# Patient Record
Sex: FEMALE | Race: WHITE | NOT HISPANIC OR LATINO | Employment: OTHER | URBAN - METROPOLITAN AREA
[De-identification: names, ages, dates, MRNs, and addresses within clinical notes are randomized per-mention and may not be internally consistent; named-entity substitution may affect disease eponyms.]

---

## 2022-10-25 LAB
BASOPHILS # BLD AUTO: 0 10E3/UL (ref 0–0.2)
BASOPHILS NFR BLD AUTO: 1 %
EOSINOPHIL # BLD AUTO: 0 10E3/UL (ref 0–0.7)
EOSINOPHIL NFR BLD AUTO: 0 %
ERYTHROCYTE [DISTWIDTH] IN BLOOD BY AUTOMATED COUNT: 11.7 % (ref 10–15)
HCT VFR BLD AUTO: 41.7 % (ref 35–47)
HGB BLD-MCNC: 13.9 G/DL (ref 11.7–15.7)
IMM GRANULOCYTES # BLD: 0 10E3/UL
IMM GRANULOCYTES NFR BLD: 1 %
LYMPHOCYTES # BLD AUTO: 0.4 10E3/UL (ref 0.8–5.3)
LYMPHOCYTES NFR BLD AUTO: 20 %
MCH RBC QN AUTO: 32 PG (ref 26.5–33)
MCHC RBC AUTO-ENTMCNC: 33.3 G/DL (ref 31.5–36.5)
MCV RBC AUTO: 96 FL (ref 78–100)
MONOCYTES # BLD AUTO: 0.4 10E3/UL (ref 0–1.3)
MONOCYTES NFR BLD AUTO: 20 %
NEUTROPHILS # BLD AUTO: 1.2 10E3/UL (ref 1.6–8.3)
NEUTROPHILS NFR BLD AUTO: 58 %
NRBC # BLD AUTO: 0 10E3/UL
NRBC BLD AUTO-RTO: 0 /100
PLATELET # BLD AUTO: 178 10E3/UL (ref 150–450)
RBC # BLD AUTO: 4.35 10E6/UL (ref 3.8–5.2)
WBC # BLD AUTO: 2 10E3/UL (ref 4–11)

## 2022-10-25 PROCEDURE — 250N000013 HC RX MED GY IP 250 OP 250 PS 637: Performed by: EMERGENCY MEDICINE

## 2022-10-25 PROCEDURE — 80048 BASIC METABOLIC PNL TOTAL CA: CPT | Performed by: EMERGENCY MEDICINE

## 2022-10-25 PROCEDURE — 99284 EMERGENCY DEPT VISIT MOD MDM: CPT | Mod: 25

## 2022-10-25 PROCEDURE — 84703 CHORIONIC GONADOTROPIN ASSAY: CPT | Performed by: EMERGENCY MEDICINE

## 2022-10-25 PROCEDURE — 85025 COMPLETE CBC W/AUTO DIFF WBC: CPT | Performed by: EMERGENCY MEDICINE

## 2022-10-25 PROCEDURE — 36415 COLL VENOUS BLD VENIPUNCTURE: CPT | Performed by: EMERGENCY MEDICINE

## 2022-10-25 RX ORDER — ACETAMINOPHEN 500 MG
1000 TABLET ORAL ONCE
Status: COMPLETED | OUTPATIENT
Start: 2022-10-25 | End: 2022-10-25

## 2022-10-25 RX ADMIN — ACETAMINOPHEN 1000 MG: 500 TABLET ORAL at 22:19

## 2022-10-26 ENCOUNTER — TELEPHONE (OUTPATIENT)
Dept: EMERGENCY MEDICINE | Facility: CLINIC | Age: 37
End: 2022-10-26

## 2022-10-26 ENCOUNTER — HOSPITAL ENCOUNTER (EMERGENCY)
Facility: CLINIC | Age: 37
Discharge: HOME | End: 2022-10-26
Attending: EMERGENCY MEDICINE | Admitting: EMERGENCY MEDICINE
Payer: COMMERCIAL

## 2022-10-26 VITALS
OXYGEN SATURATION: 99 % | TEMPERATURE: 97 F | SYSTOLIC BLOOD PRESSURE: 117 MMHG | DIASTOLIC BLOOD PRESSURE: 115 MMHG | RESPIRATION RATE: 18 BRPM | HEART RATE: 37 BPM

## 2022-10-26 DIAGNOSIS — H53.149 PHOTOPHOBIA: ICD-10-CM

## 2022-10-26 DIAGNOSIS — Z86.69 H/O MIGRAINE: ICD-10-CM

## 2022-10-26 DIAGNOSIS — R51.9 ACUTE NONINTRACTABLE HEADACHE, UNSPECIFIED HEADACHE TYPE: ICD-10-CM

## 2022-10-26 DIAGNOSIS — R00.1 SINUS BRADYCARDIA: ICD-10-CM

## 2022-10-26 LAB
ANION GAP SERPL CALCULATED.3IONS-SCNC: 14 MMOL/L (ref 7–15)
ATRIAL RATE - MUSE: 45 BPM
BUN SERPL-MCNC: 10.1 MG/DL (ref 6–20)
CALCIUM SERPL-MCNC: 8.8 MG/DL (ref 8.6–10)
CHLORIDE SERPL-SCNC: 92 MMOL/L (ref 98–107)
CREAT SERPL-MCNC: 0.67 MG/DL (ref 0.51–0.95)
DEPRECATED HCO3 PLAS-SCNC: 21 MMOL/L (ref 22–29)
DIASTOLIC BLOOD PRESSURE - MUSE: NORMAL MMHG
FLUAV RNA SPEC QL NAA+PROBE: NEGATIVE
FLUBV RNA RESP QL NAA+PROBE: NEGATIVE
GFR SERPL CREATININE-BSD FRML MDRD: >90 ML/MIN/1.73M2
GLUCOSE SERPL-MCNC: 146 MG/DL (ref 70–99)
HCG SERPL QL: NEGATIVE
HOLD SPECIMEN: NORMAL
INTERPRETATION ECG - MUSE: NORMAL
P AXIS - MUSE: 60 DEGREES
POTASSIUM SERPL-SCNC: 3.6 MMOL/L (ref 3.4–5.3)
PR INTERVAL - MUSE: 154 MS
QRS DURATION - MUSE: 88 MS
QT - MUSE: 520 MS
QTC - MUSE: 449 MS
R AXIS - MUSE: 86 DEGREES
RSV RNA SPEC NAA+PROBE: POSITIVE
SARS-COV-2 RNA RESP QL NAA+PROBE: POSITIVE
SODIUM SERPL-SCNC: 127 MMOL/L (ref 136–145)
SYSTOLIC BLOOD PRESSURE - MUSE: NORMAL MMHG
T AXIS - MUSE: 87 DEGREES
VENTRICULAR RATE- MUSE: 45 BPM

## 2022-10-26 PROCEDURE — 96374 THER/PROPH/DIAG INJ IV PUSH: CPT

## 2022-10-26 PROCEDURE — 250N000013 HC RX MED GY IP 250 OP 250 PS 637: Performed by: EMERGENCY MEDICINE

## 2022-10-26 PROCEDURE — 96361 HYDRATE IV INFUSION ADD-ON: CPT

## 2022-10-26 PROCEDURE — C9803 HOPD COVID-19 SPEC COLLECT: HCPCS

## 2022-10-26 PROCEDURE — 93005 ELECTROCARDIOGRAM TRACING: CPT

## 2022-10-26 PROCEDURE — 96375 TX/PRO/DX INJ NEW DRUG ADDON: CPT

## 2022-10-26 PROCEDURE — 87637 SARSCOV2&INF A&B&RSV AMP PRB: CPT | Performed by: EMERGENCY MEDICINE

## 2022-10-26 PROCEDURE — 250N000011 HC RX IP 250 OP 636: Performed by: EMERGENCY MEDICINE

## 2022-10-26 PROCEDURE — 258N000003 HC RX IP 258 OP 636: Performed by: EMERGENCY MEDICINE

## 2022-10-26 RX ORDER — DIPHENHYDRAMINE HYDROCHLORIDE 50 MG/ML
25 INJECTION INTRAMUSCULAR; INTRAVENOUS ONCE
Status: COMPLETED | OUTPATIENT
Start: 2022-10-26 | End: 2022-10-26

## 2022-10-26 RX ORDER — METOCLOPRAMIDE HYDROCHLORIDE 5 MG/ML
10 INJECTION INTRAMUSCULAR; INTRAVENOUS ONCE
Status: COMPLETED | OUTPATIENT
Start: 2022-10-26 | End: 2022-10-26

## 2022-10-26 RX ORDER — SODIUM CHLORIDE 9 MG/ML
INJECTION, SOLUTION INTRAVENOUS CONTINUOUS
Status: DISCONTINUED | OUTPATIENT
Start: 2022-10-26 | End: 2022-10-26 | Stop reason: HOSPADM

## 2022-10-26 RX ORDER — LORAZEPAM 0.5 MG/1
0.5 TABLET ORAL ONCE
Status: COMPLETED | OUTPATIENT
Start: 2022-10-26 | End: 2022-10-26

## 2022-10-26 RX ORDER — KETOROLAC TROMETHAMINE 15 MG/ML
15 INJECTION, SOLUTION INTRAMUSCULAR; INTRAVENOUS ONCE
Status: COMPLETED | OUTPATIENT
Start: 2022-10-26 | End: 2022-10-26

## 2022-10-26 RX ADMIN — METOCLOPRAMIDE HYDROCHLORIDE 10 MG: 5 INJECTION INTRAMUSCULAR; INTRAVENOUS at 01:12

## 2022-10-26 RX ADMIN — DIPHENHYDRAMINE HYDROCHLORIDE 25 MG: 50 INJECTION, SOLUTION INTRAMUSCULAR; INTRAVENOUS at 01:11

## 2022-10-26 RX ADMIN — KETOROLAC TROMETHAMINE 15 MG: 15 INJECTION, SOLUTION INTRAMUSCULAR; INTRAVENOUS at 01:12

## 2022-10-26 RX ADMIN — DIPHENHYDRAMINE HYDROCHLORIDE 25 MG: 50 INJECTION, SOLUTION INTRAMUSCULAR; INTRAVENOUS at 01:36

## 2022-10-26 RX ADMIN — LORAZEPAM 0.5 MG: 0.5 TABLET ORAL at 01:36

## 2022-10-26 RX ADMIN — SODIUM CHLORIDE 1000 ML: 9 INJECTION, SOLUTION INTRAVENOUS at 01:13

## 2022-10-26 ASSESSMENT — ENCOUNTER SYMPTOMS
NAUSEA: 1
VOMITING: 0
HEADACHES: 1
COUGH: 0
PHOTOPHOBIA: 1
SORE THROAT: 0

## 2022-10-26 ASSESSMENT — ACTIVITIES OF DAILY LIVING (ADL): ADLS_ACUITY_SCORE: 33

## 2022-10-26 NOTE — DISCHARGE INSTRUCTIONS
Discharge Instructions  Migraine    You were seen today for a headache that your provider thinks is likely a migraine. At this time your provider does not find that your headache is a sign of anything dangerous or life-threatening.  However, sometimes the signs of serious illness do not show up right away.      Generally, every Emergency Department visit should have a follow-up clinic visit with either a primary or a specialty clinic/provider. Please follow-up as instructed by your emergency provider today.    Return to the Emergency Department if:  You get a fever of 100.4 F or higher.  You get a stiff neck with your headache.  You get a new headache that is different or worse than headaches you have had before.  You are vomiting (throwing up) and cannot keep food or water down.  You have blurry or double vision or other problems with your eyes.  You have a new weakness on one side of your body.  You have difficulty with balance which is new.  You or your family thinks you are confused.  You have a seizure.    Treatment:  Often, treatment for your migraine will take some time to make you headache stop.  Going home to sleep can be very effective.  Use your medications as directed; overuse of medications can actually cause headaches.  Once your headache has gone away, avoid triggers such as certain foods, skipping meals, bright lights, changes in sleep, exercise and stress.  Migraine headaches can have symptoms before the pain starts, like vision changes, funny smells/tastes, dizziness or other symptoms.  Treating a headache as soon as the first symptoms come on is very important and gives the best chance of stopping the headache.  If headaches are severe or frequent, you may need to start daily medication to prevent the headaches.  Carbon monoxide can cause headaches, so not burning things in your home is important.  Also get a carbon monoxide detector.  Some medications for migraines may raise your blood pressure,  so use with caution if you have high blood pressure or heart problems.  If you were given a prescription for medicine here today, be sure to read all of the information (including the package insert) that comes with your prescription.  This will include important information about the medicine, its side effects, and any warnings that you need to know about.  The pharmacist who fills the prescription can provide more information and answer questions you may have about the medicine.  If you have questions or concerns that the pharmacist cannot address, please call or return to the Emergency Department.   Remember that you can always come back to the Emergency Department if you are not able to see your regular provider in the amount of time listed above, if you get any new symptoms, or if there is anything that worries you.     Your heartrate was noted to be low here, specifically after getting medications. Despite this you did not have symptoms that were concerning. Discuss further assessment with your primary care provider.

## 2022-10-26 NOTE — TELEPHONE ENCOUNTER
[facility] Emergency Department/Urgent Care Lab result notification     Patient/parent Name  Soledad    Lab result (if applicable):  Negative for Influenza A, Influenza B  Positive RSV  Positive Covid19 result and the patient will be notified of their positive Covid19 result via [email or web address] (if active) or they will be contacted by via phone call if not active on [email or web address].  A letter is sent to patient via [email or web address] or via mail (if no [email or web address]).      RN Recommendations/Instructions per [place] ED lab result protocol  Patient notified of lab result and treatment recommendations. Notified of RSV and that she also has Covid  She is aware that a letter will be sent and that someone from the Covid19 team would be calling her.  RN was going to go over the information in the letter when the call was cut off.    Femi Porras RN  [facility]  Emergency Dept Lab Result RN  Ph# [phone]

## 2022-10-26 NOTE — ED NOTES
Pt's heart rate increased to mid 60s upon sitting up in bed and was able to walk around asymptomatically. Pt felt ready for discharge.

## 2022-10-26 NOTE — TELEPHONE ENCOUNTER
[facility] Emergency Department/Urgent Care Lab result notification:    Reason for call  Notify of lab results, assess symptoms,  review ED providers recommendations (if necessary) and advise per ED lab result f/u protocol.    Lab result  Negative for Influenza A, Influenza B  Positive RSV  Positive Covid19 result and the patient will be notified of their positive Covid19 result via [employer] (if active) or they will be contacted by via phone call if not active on [employer].  A letter is sent to patient via [employer] or via mail (if no POWt).    3:22p  Left voicemail message requesting a call back to [phone] between 9 a.m. and 5:30 p.m. for patient's ED/UC lab results.      Lucy Drummond, STEPH  [facility] Result STEPH  [facility] Emergency Dept Lab Result RN  # [phone]

## 2022-10-26 NOTE — ED PROVIDER NOTES
History   Chief Complaint:  Migraine       The history is provided by the patient.      Soledad Rios is a 37 year old female with history of migraines who presents with migraine. This morning she woke up with a headache which worsened into a migraine into the afternoon. She cannot sleep and it feels similar to past migraines. She felt feverish but did not take a temperature. Notes photophobia and nausea. She has not eaten or drank as much today. She took Ibuprofen at 1830 with no relief. It has been awhile since she got a migraine and attributes her migraines to a sensitivity to nuts. Denies trauma to the head. Denies cough, congestion, sore throat, or vomiting. She is breastfeeding right now.    Review of Systems   HENT: Negative for congestion and sore throat.    Eyes: Positive for photophobia.   Respiratory: Negative for cough.    Gastrointestinal: Positive for nausea. Negative for vomiting.   Neurological: Positive for headaches.   All other systems reviewed and are negative.      Allergies:  No Known Allergies    Medications:  The patient is currently on no regular medications.    Past Medical History:     PCOS  Anovulation  Migraine  Plantar wart  UTI  Secondary amenorrhea  Varicella  Infertility    Past Surgical History:    Orient teeth extraction  Bone spur foot    Family History:    Father: prostate cancer    Social History:  The patient presents to the ED with   PCP: No primary care provider on file.     Physical Exam     Patient Vitals for the past 24 hrs:   BP Temp Temp src Pulse Resp SpO2   10/26/22 0226 -- -- -- -- -- 98 %   10/26/22 0131 (!) 117/115 -- -- -- -- 98 %   10/26/22 0121 -- -- -- -- -- 98 %   10/26/22 0118 -- -- -- -- -- 99 %   10/26/22 0100 99/66 -- -- (!) 37 -- 99 %   10/25/22 2037 104/71 97  F (36.1  C) Temporal 60 18 99 %       Physical Exam  General: Uncomfortable appearing adult female, sitting upright in a dimly room.  Eyes: PERRL, Conjunctive within normal limits.   EOMI.  ENT: Moist mucous membranes, oropharynx clear.   CV: Normal S1S2, no murmur, rub or gallop. Regular rate and rhythm  Resp: Clear to auscultation bilaterally.  Normal respiratory effort.  GI: Abdomen is soft, nontender and nondistended.   MSK: No edema. Normal active range of motion.  Skin: Warm and dry. No rashes or lesions or ecchymoses on visible skin.  Neuro: Alert and oriented. Responds appropriately to all questions and commands. No focal findings appreciated. Normal muscle tone.  No facial asymmetry.  Speech is normal.  Psych: Appropriate mood and affect    Emergency Department Course   ECG  ECG taken at 0204, ECG read at 0210  Sinus bradycardia with sinus arrhythmia.   Rate 45 bpm. AK interval 154 ms. QRS duration 88 ms. QT/QTc 520/449 ms. P-R-T axes 60 86 87.     Laboratory:  Labs Ordered and Resulted from Time of ED Arrival to Time of ED Departure   BASIC METABOLIC PANEL - Abnormal       Result Value    Sodium 127 (*)     Potassium 3.6      Chloride 92 (*)     Carbon Dioxide (CO2) 21 (*)     Anion Gap 14      Urea Nitrogen 10.1      Creatinine 0.67      Calcium 8.8      Glucose 146 (*)     GFR Estimate >90     CBC WITH PLATELETS AND DIFFERENTIAL - Abnormal    WBC Count 2.0 (*)     RBC Count 4.35      Hemoglobin 13.9      Hematocrit 41.7      MCV 96      MCH 32.0      MCHC 33.3      RDW 11.7      Platelet Count 178      % Neutrophils 58      % Lymphocytes 20      % Monocytes 20      % Eosinophils 0      % Basophils 1      % Immature Granulocytes 1      NRBCs per 100 WBC 0      Absolute Neutrophils 1.2 (*)     Absolute Lymphocytes 0.4 (*)     Absolute Monocytes 0.4      Absolute Eosinophils 0.0      Absolute Basophils 0.0      Absolute Immature Granulocytes 0.0      Absolute NRBCs 0.0     HCG QUALITATIVE PREGNANCY - Normal    hCG Serum Qualitative Negative     INFLUENZA A/B & SARS-COV2 PCR MULTIPLEX        Emergency Department Course:         Reviewed:  I reviewed nursing notes, vitals, past medical  history and Care Everywhere    Assessments:  0044 I obtained history and examined the patient as noted above.   0128 I rechecked the patient.  She notes she is feeling restless.   0219 I rechecked the patient.  I awoke her from sleep.  She states her headache is gone and she feels comfortable plan for discharge.  She is not feeling dizzy.  Discussed low heart rate with her.  She reports she has had similar low heart rate in the past after giving birth.      Interventions:  2219 Tylenol 1,000 mg Oral  0111 Benadryl 25 mg IV  0112 Reglan 10 mg IV  0112 Toradol 15 mg IV  0113 NS 1L IV   0136 Benadryl 25 mg IV  0136 Ativan 0.5 mg Oral    Disposition:  The patient was discharged to home.     Impression & Plan       Medical Decision Making:  The patient presented with a headache consistent with his/her normal migraine but intractable with home therapies.  Evaluation in the emergency department has been negative. The patient has not had any fever, weakness, numbness, paresthesia, neck stiffness or confusion. Meningitis, subarachnoid hemorrhage, CNS tumor, and stroke are considered as part of the differential, and considered unlikely. The pain has improved with medication interventions.  She did have bradycardia that felt over her stay, potentially medication induced or due to increased vagal tone.  She is asymptomatic with regards to the bradycardia experienced here in had to come pleat resolution of her symptoms with regards to her presentation.  She understands that she should have further assessment of her headaches and the bradycardia with her primary care provider.  The patient should follow-up with his/her primary physician within 3 days. If the headache continues or the frequency increases, consultation with neurology will be indicated.  Return if increasing dizziness/syncope, uncontrolled pain, fever, vomiting or weakness.  Also discussed in the setting of breast-feeding, pumping and discarding her current milk due  to medication administration here.  All questions were answered prior to discharge.      Diagnosis:    ICD-10-CM    1. Acute nonintractable headache, unspecified headache type  R51.9       2. Photophobia  H53.149       3. H/O migraine  Z86.69       4. Sinus bradycardia  R00.1           Scribe Disclosure:  I, Alyson Ward, am serving as a scribe at 12:41 AM on 10/26/2022 to document services personally performed by Catalina Mello MD based on my observations and the provider's statements to me.          Catalina Mello MD  10/26/22 2990

## 2022-10-26 NOTE — ED TRIAGE NOTES
"H/o migraine. Started this morning. Some blurred vision and nausea. Numbness and \"throbbing all over my body.\" Tearful and tachypnea. Able to slow breathing when coached. Photophobia. Pain is \"everywhere\" in head. Last dose ibuprofen at 1830.       "

## 2022-10-27 ENCOUNTER — TELEPHONE (OUTPATIENT)
Dept: NURSING | Facility: CLINIC | Age: 37
End: 2022-10-27

## 2022-10-27 NOTE — TELEPHONE ENCOUNTER
Patient classified as COVID treatment eligible by Epic high risk algorithm:  No    Coronavirus (COVID-19) Notification    Reason for call  Notify of POSITIVE COVID-19 lab result, assess symptoms,  review [facility] recommendations    Lab Result   Lab test for 2019-nCoV rRt-PCR or SARS-COV-2 PCR  Oropharyngeal AND/OR nasopharyngeal swabs were POSITIVE for 2019-nCoV RNA [OR] SARS-COV-2 RNA (COVID-19) RNA     We have been unable to reach patient by phone at this time to notify of their Positive COVID-19 result.    Left voicemail message requesting a call back to [phone] [facility] for results.        A Positive COVID-19 letter will be sent via [employer] or the mail.    [facility]